# Patient Record
Sex: FEMALE | Race: WHITE | NOT HISPANIC OR LATINO | Employment: OTHER | URBAN - METROPOLITAN AREA
[De-identification: names, ages, dates, MRNs, and addresses within clinical notes are randomized per-mention and may not be internally consistent; named-entity substitution may affect disease eponyms.]

---

## 2019-11-09 ENCOUNTER — OFFICE VISIT (OUTPATIENT)
Dept: URGENT CARE | Facility: PHYSICIAN GROUP | Age: 69
End: 2019-11-09
Payer: MEDICARE

## 2019-11-09 VITALS
TEMPERATURE: 100 F | DIASTOLIC BLOOD PRESSURE: 100 MMHG | WEIGHT: 170 LBS | RESPIRATION RATE: 18 BRPM | HEART RATE: 104 BPM | HEIGHT: 66 IN | BODY MASS INDEX: 27.32 KG/M2 | SYSTOLIC BLOOD PRESSURE: 164 MMHG | OXYGEN SATURATION: 94 %

## 2019-11-09 DIAGNOSIS — R07.9 CHEST PAIN, UNSPECIFIED TYPE: ICD-10-CM

## 2019-11-09 DIAGNOSIS — I10 HYPERTENSION, UNSPECIFIED TYPE: ICD-10-CM

## 2019-11-09 DIAGNOSIS — F41.9 ANXIETY: ICD-10-CM

## 2019-11-09 LAB
FLUAV+FLUBV AG SPEC QL IA: NORMAL
INT CON NEG: NEGATIVE
INT CON POS: POSITIVE

## 2019-11-09 PROCEDURE — 99204 OFFICE O/P NEW MOD 45 MIN: CPT | Performed by: PHYSICIAN ASSISTANT

## 2019-11-09 PROCEDURE — 87804 INFLUENZA ASSAY W/OPTIC: CPT | Performed by: PHYSICIAN ASSISTANT

## 2019-11-09 ASSESSMENT — PAIN SCALES - GENERAL: PAINLEVEL: 4=SLIGHT-MODERATE PAIN

## 2019-11-10 ASSESSMENT — ENCOUNTER SYMPTOMS
PALPITATIONS: 1
DIZZINESS: 1
SHORTNESS OF BREATH: 0
WHEEZING: 0
FEVER: 0
HEMOPTYSIS: 0
NERVOUS/ANXIOUS: 1
CHILLS: 0
SPUTUM PRODUCTION: 0
IRREGULAR HEARTBEAT: 1
COUGH: 0
CHEST PRESSURE: 1
SORE THROAT: 0

## 2019-11-10 NOTE — PROGRESS NOTES
"Subjective:      Daniela Correia is a 68 y.o. female who presents with Chest Injury (\"quivering of the insides\", has been on and off and seems to move from one side to the other, microvalvue problem )            Palpitations    This is a recurrent problem. The current episode started more than 1 month ago. The problem occurs intermittently. The problem has been unchanged. Associated symptoms include anxiety, chest fullness, chest pain, dizziness, an irregular heartbeat and malaise/fatigue. Pertinent negatives include no coughing, fever or shortness of breath. She has tried anxiolytics for the symptoms. The treatment provided significant relief.       Review of Systems   Constitutional: Positive for malaise/fatigue. Negative for chills and fever.   HENT: Negative for congestion, ear pain and sore throat.    Respiratory: Negative for cough, hemoptysis, sputum production, shortness of breath and wheezing.    Cardiovascular: Positive for chest pain and palpitations.   Neurological: Positive for dizziness.   Psychiatric/Behavioral: The patient is nervous/anxious.    All other systems reviewed and are negative.    PMH:  has no past medical history on file.  MEDS: No current outpatient medications on file.  ALLERGIES: No Known Allergies  SURGHX: History reviewed. No pertinent surgical history.  SOCHX:  reports that she has never smoked. She has never used smokeless tobacco.  FH: Family history was reviewed, no pertinent findings to report  Medications, Allergies, and current problem list reviewed today in Epic       Objective:     Blood Pressure (Abnormal) 164/100 (BP Location: Right arm, Patient Position: Sitting, BP Cuff Size: Adult long)   Pulse (Abnormal) 104   Temperature 37.8 °C (100 °F) (Tympanic)   Respiration 18   Height 1.676 m (5' 6\")   Weight 77.1 kg (170 lb)   Oxygen Saturation 94%   Body Mass Index 27.44 kg/m²      Physical Exam  Vitals signs reviewed.   Constitutional:       General: She is not in acute " distress.     Appearance: She is well-developed. She is not ill-appearing, toxic-appearing or diaphoretic.   Cardiovascular:      Rate and Rhythm: Normal rate and regular rhythm.      Pulses: Normal pulses.      Heart sounds: Normal heart sounds. No murmur. No friction rub. No gallop.    Pulmonary:      Effort: Pulmonary effort is normal. No respiratory distress.      Breath sounds: Normal breath sounds. No wheezing or rales.   Chest:      Chest wall: No tenderness.   Abdominal:      General: Bowel sounds are normal. There is no distension.      Palpations: Abdomen is soft. There is no mass.      Tenderness: There is no tenderness. There is no guarding or rebound.      Hernia: No hernia is present.   Musculoskeletal: Normal range of motion.      Comments: No reproducible chest pain with palpation.   Skin:     General: Skin is warm and dry.      Findings: No erythema.   Neurological:      Mental Status: She is alert and oriented to person, place, and time.   Psychiatric:         Speech: Speech normal.         Behavior: Behavior normal.         Thought Content: Thought content normal.         Judgment: Judgment normal.            Interpreted by me    Rate: Normal  Rhythm: Normal sinus   Axis: Left axis deviation  Interval/Conduction: Normal  Enlargement: None    Ischemia:      ST Segments: no acute change      T Waves: no acute change      Q Waves: none    Comparison: NONE    Clinical Impression: no acute changes and normal EKG w/ Left axis deviation, see scanned sheet       Assessment/Plan:   Pt is a non-obese 68-year-old female who presents for evaluation of chest pain, palpitations, anxiety.  Pt states this is a recurrent problem has been going on over a year.  She was previously put on antidepressants which helped the symptoms but has not been taking them for some time.  Denies red flag symptoms of SOB, syncope/presyncope, AMS, weakness, N/V, diaphoresis.  No PMH of HTN, DM, or smoking.  Unremarkable Family  History.  Vital signs show hypertension.  Temperature is rechecked and was found to be 95.  Lungs are clear to auscultation.  RRR with murmur-, rubs or gallops of heart ausculation.  No JVD.  Pain is not reproducible with chest palpation.    EKG shows normal sinus rhythm. Wells score for PE: Low, Heart score: Low (limited, no troponin available).   I discussed with patient that a normal EKG does not exclude any life threatening process and Urgent Care has limited diagnostics for this complaint.  Patient believes this is due to her anxiety and would like referral to family practice to continue anti-anxiety medication.  Strict ED precautions were discussed.    Diagnosis differential includes but not limited to:  Aortic Dissection, Myocardial Ichemia/Infarct, ACS, Esophageal rupture, Cardiac Tamponade, and Pulmonary Embolism.         1. Chest pain, unspecified type    - REFERRAL TO FAMILY PRACTICE  - POCT Influenza A/B    2. Anxiety    - REFERRAL TO FAMILY PRACTICE    3. Hypertension, unspecified type    - REFERRAL TO FAMILY PRACTICE    Differential diagnosis, natural history, supportive care discussed. Follow-up with primary care provider within 7-10 days, emergency room precautions discussed.  Patient and/or family appears understanding of information.  Handout and review of patients diagnosis and treatment was discussed extensively.

## 2021-01-15 DIAGNOSIS — Z23 NEED FOR VACCINATION: ICD-10-CM

## 2021-02-25 ENCOUNTER — OFFICE VISIT (OUTPATIENT)
Dept: URGENT CARE | Facility: PHYSICIAN GROUP | Age: 71
End: 2021-02-25
Payer: MEDICARE

## 2021-02-25 VITALS
BODY MASS INDEX: 28.32 KG/M2 | HEART RATE: 103 BPM | RESPIRATION RATE: 18 BRPM | DIASTOLIC BLOOD PRESSURE: 92 MMHG | WEIGHT: 170 LBS | SYSTOLIC BLOOD PRESSURE: 160 MMHG | OXYGEN SATURATION: 96 % | TEMPERATURE: 97.6 F | HEIGHT: 65 IN

## 2021-02-25 DIAGNOSIS — B02.9 HERPES ZOSTER WITHOUT COMPLICATION: ICD-10-CM

## 2021-02-25 PROCEDURE — 99214 OFFICE O/P EST MOD 30 MIN: CPT | Performed by: PHYSICIAN ASSISTANT

## 2021-02-25 RX ORDER — VALACYCLOVIR HYDROCHLORIDE 500 MG/1
1000 TABLET, FILM COATED ORAL 3 TIMES DAILY
Qty: 42 TABLET | Refills: 0 | Status: SHIPPED | OUTPATIENT
Start: 2021-02-25 | End: 2021-03-04

## 2021-02-25 ASSESSMENT — ENCOUNTER SYMPTOMS
EYE REDNESS: 0
FEVER: 0
VOMITING: 0
HEADACHES: 0
NAUSEA: 0
SORE THROAT: 0
COUGH: 0
SHORTNESS OF BREATH: 0
EYE DISCHARGE: 0

## 2021-02-25 NOTE — PROGRESS NOTES
"Subjective:      Daniela Correia is a 70 y.o. female who presents with Rash (rash on head, 4 days. had pain but its more irritation than pain now. )        The patient presents to clinic complaining of a rash to her left scalp and left forehead x4 days.  The patient states the rash was painful at the onset of symptoms.  The patient describes the pain as burning/stinging to her scalp.  The patient states the rash has continued to progress over the past several days.  The patient states the rash is currently feels \"irritated\".  The patient reports no associated fever.  No discharge/drainage from the rash.  No swelling.  The patient reports no involvement of the left eye.  No lesions to the periorbital region.  No eye pain.  No eye redness.  No discharge/drainage from the left eye.  No vision changes.  The patient has taken OTC Aleve for her current symptoms.  Patient reports a history of chickenpox as a child.    Rash  This is a new problem. Episode onset: x 4 days ago. The problem has been gradually worsening since onset. The affected locations include the scalp and face (left scalp and forehead). The rash is characterized by blistering and burning. She was exposed to nothing. Pertinent negatives include no congestion, cough, facial edema, fever, joint pain, shortness of breath, sore throat or vomiting. Treatments tried: OTC Aleve. Her past medical history is significant for varicella.     PMH:  has no past medical history on file.  MEDS:   Current Outpatient Medications:   •  valACYclovir (VALTREX) 500 MG Tab, Take 2 Tablets by mouth 3 times a day for 7 days., Disp: 42 tablet, Rfl: 0  ALLERGIES: No Known Allergies  SURGHX: No past surgical history on file.  SOCHX:  reports that she has never smoked. She has never used smokeless tobacco.  FH: Family history was reviewed, no pertinent findings to report      Review of Systems   Constitutional: Negative for fever.   HENT: Negative for congestion, ear pain and sore " "throat.    Eyes: Negative for discharge and redness.   Respiratory: Negative for cough and shortness of breath.    Cardiovascular: Negative for chest pain and leg swelling.   Gastrointestinal: Negative for nausea and vomiting.   Musculoskeletal: Negative for joint pain.   Skin: Positive for rash.   Neurological: Negative for headaches.          Objective:     /92   Pulse (!) 103   Temp 36.4 °C (97.6 °F) (Temporal)   Resp 18   Ht 1.651 m (5' 5\")   Wt 77.1 kg (170 lb)   SpO2 96%   BMI 28.29 kg/m²      Physical Exam  Constitutional:       General: She is not in acute distress.     Appearance: Normal appearance. She is not ill-appearing.   HENT:      Head: Normocephalic and atraumatic.        Comments:   Left Scalp and Forehead:  Scattered patches of vesicles on an erythematous base to the patient's left scalp and left forehead with intermittent crusting to the lesions to the patient's left scalp.  No tenderness to palpation.  No swelling.  No surrounding erythema.  No increased warmth.  No discharge/weeping.  No pustules.  No involvement of the periorbital region.     Right Ear: Tympanic membrane, ear canal and external ear normal.      Left Ear: Tympanic membrane, ear canal and external ear normal.      Ears:      Comments:   Left Ear Canal:  No vesicles to the left ear canal     Nose: Nose normal.      Mouth/Throat:      Mouth: Mucous membranes are moist.      Pharynx: Oropharynx is clear. No posterior oropharyngeal erythema.   Eyes:      Extraocular Movements: Extraocular movements intact.      Conjunctiva/sclera: Conjunctivae normal.      Pupils: Pupils are equal, round, and reactive to light.   Cardiovascular:      Rate and Rhythm: Normal rate and regular rhythm.      Heart sounds: Normal heart sounds.   Pulmonary:      Effort: Pulmonary effort is normal. No respiratory distress.      Breath sounds: Normal breath sounds. No wheezing.   Musculoskeletal:         General: Normal range of motion.      " Cervical back: Normal range of motion and neck supple.   Skin:     General: Skin is warm and dry.   Neurological:      Mental Status: She is alert and oriented to person, place, and time.                 Assessment/Plan:        1. Herpes zoster without complication  - valACYclovir (VALTREX) 500 MG Tab; Take 2 Tablets by mouth 3 times a day for 7 days.  Dispense: 42 tablet; Refill: 0    The patient's presenting symptoms and physical exam findings are consistent with herpes zoster of the left scalp and forehead.  On physical exam, the patient has scattered patches of vesicles on an erythematous base to the left scalp and left forehead with intermittent crusting to the lesions to the patient's left scalp.  No tenderness palpation, swelling, surrounding erythema, increased warmth, discharge/weeping, or pustules were noted.  No involvement of the left periorbital region was appreciated.  Additionally, the patient had no vesicles to the left ear canal.  The remainder the patient's physical exam today in clinic was normal.  The patient appears in no acute distress.  The patient's vital signs are stable and within normal limits, she is afebrile today in clinic.  Will prescribe the patient valacyclovir for her acute shingles outbreak.  Advised the patient to monitor for involvement of the left eye.  Instructed the patient to follow-up with an optometrist if she develops symptoms of the left eye.  The patient verbalized understanding.  Based on the patient's presenting symptoms and physical exam findings, it is unlikely the patient's shingles rash is secondarily infected at this time.  Recommend OTC medications and supportive care for symptomatic management.  Recommend patient follow-up with her PCP as needed.  Discussed return precautions with the patient, and she verbalized understanding.    Differential diagnoses, supportive care, and indications for immediate follow-up discussed with patient.   Instructed to return to  clinic or nearest emergency department for any change in condition, further concerns, or worsening of symptoms.    OTC Tylenol or Motrin for fever/discomfort.  Keep lesions clean and dry  Monitor for secondary signs of infection  Follow-up with PCP  AVS printed  Return to clinic or go to the ED if symptoms worsen or fail to improve, or if the patient should develop worsening/increasing/persistent rash to the left scalp and forehead, pain/tenderness the affected area, swelling, increased redness or warmth, discharge/weeping, involvement of the left eye or the left periorbital region, eye pain, eye redness, discharge/drainage of the left eye, vision changes, headache, nausea/vomiting, fever/chills, secondary signs infection, and/or any concerning symptoms.    Discussed plan with the patient, and she agrees to the above.     I personally reviewed prior external notes and test results pertinent to today's visit.  I have independently reviewed and interpreted all diagnostics ordered during this urgent care visit.     Time spent evaluating this patient was at least 30 minutes and includes preparing for visit, obtaining history, exam and evaluation, ordering labs/tests/procedures/medications, independent interpretation, and counseling/education.    Please note that this dictation was created using voice recognition software. I have made every reasonable attempt to correct obvious errors, but I expect that there may be errors of grammar and possibly content that I did not discover before finalizing the note.     This note was electronically signed by Yael He PA-C

## 2021-02-26 ENCOUNTER — NURSE TRIAGE (OUTPATIENT)
Dept: HEALTH INFORMATION MANAGEMENT | Facility: OTHER | Age: 71
End: 2021-02-26

## 2021-02-26 NOTE — TELEPHONE ENCOUNTER
"Pt's  was told at  that patient might need eye drops if shingles moves to eye. Informed that MD note says to return to  if shingles travels to eye and they will give her a prescription for eye drops if needed.    Reason for Disposition  • Shingles rash already diagnosed and taking antiviral medication    Additional Information  • Negative: Difficult to awaken or acting confused (e.g., disoriented, slurred speech)  • Negative: Sounds like a life-threatening emergency to the triager  • Negative: Localized rash and doesn't match the SYMPTOMS of shingles  • Negative: Back pain and doesn't match the SYMPTOMS of shingles  • Negative: Shingles Vaccine (Recombinant Zoster Vaccine; RZV; Shingrix), questions about  • Negative: Shingles rash of face and eye pain or blurred vision  • Negative: Shingles rash on the eyelid or tip of the nose  • Negative: Shingles rash and spots start appearing other places on body  • Negative: Patient sounds very sick or weak to the triager  • Negative: Shingles rash (matches SYMPTOMS) and weak immune system (e.g., HIV positive,  cancer chemotherapy, chronic steroid treatment, splenectomy) and NOT taking antiviral medication  • Negative: Shingles rash of face and facial weakness  • Negative: Shingles rash of face or ear and earache or ringing in the ear  • Negative: Fever > 100.5 F (38.1 C)  • Negative: SEVERE pain (e.g., excruciating)  • Negative: Shingles rash (matches SYMPTOMS) and onset within past 72 hours  • Negative: Looks infected (spreading redness, pus) and no fever  • Negative: Patient wants to be seen  • Negative: Shingles rash and onset > 72 hours ago  • Negative: Shingle rash already diagnosed and weak immune system (e.g., HIV positive,  cancer chemotherapy, chronic steroid treatment, splenectomy) and  taking antiviral medication  • Negative: Pain lasting > 1 month after rash disappears    Answer Assessment - Initial Assessment Questions  1. APPEARANCE of RASH: \"Describe " "the rash.\"       Shingles diagnosed  2. LOCATION: \"Where is the rash located?\"       forehead  3. ONSET: \"When did the rash start?\"       yesterday  4. ITCHING: \"Does the rash itch?\" If so, ask: \"How bad is the itch?\"  (Scale 1-10; or mild, moderate, severe)      no  5. PAIN: \"Does the rash hurt?\" If so, ask: \"How bad is the pain?\"  (Scale 1-10; or mild, moderate, severe)      no  6. OTHER SYMPTOMS: \"Do you have any other symptoms?\" (e.g., fever)      no  7. PREGNANCY: \"Is there any chance you are pregnant?\" \"When was your last menstrual period?\"      no    Protocols used: SHINGLES-JON      "